# Patient Record
Sex: MALE | Race: WHITE | NOT HISPANIC OR LATINO | URBAN - METROPOLITAN AREA
[De-identification: names, ages, dates, MRNs, and addresses within clinical notes are randomized per-mention and may not be internally consistent; named-entity substitution may affect disease eponyms.]

---

## 2019-04-08 ENCOUNTER — OFFICE VISIT - HEALTHEAST (OUTPATIENT)
Dept: FAMILY MEDICINE | Facility: CLINIC | Age: 34
End: 2019-04-08

## 2019-04-08 DIAGNOSIS — F17.200 SMOKING: ICD-10-CM

## 2019-04-08 DIAGNOSIS — S22.39XA CLOSED FRACTURE OF ONE RIB, UNSPECIFIED LATERALITY, INITIAL ENCOUNTER: ICD-10-CM

## 2019-04-08 ASSESSMENT — MIFFLIN-ST. JEOR: SCORE: 1746.2
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
## 2021-06-02 VITALS — BODY MASS INDEX: 23.47 KG/M2 | WEIGHT: 173.31 LBS | HEIGHT: 72 IN
